# Patient Record
Sex: FEMALE | Race: WHITE | Employment: FULL TIME | URBAN - METROPOLITAN AREA
[De-identification: names, ages, dates, MRNs, and addresses within clinical notes are randomized per-mention and may not be internally consistent; named-entity substitution may affect disease eponyms.]

---

## 2017-07-07 ENCOUNTER — TELEPHONE (OUTPATIENT)
Dept: OBGYN CLINIC | Age: 32
End: 2017-07-07

## 2017-07-27 ENCOUNTER — HOSPITAL ENCOUNTER (OUTPATIENT)
Age: 32
Setting detail: SPECIMEN
Discharge: HOME | End: 2017-07-27
Payer: COMMERCIAL

## 2017-07-28 LAB
CULTURE: NO GROWTH
CULTURE: NORMAL
Lab: NORMAL
SPECIMEN DESCRIPTION: NORMAL
STATUS: NORMAL

## 2017-12-18 ENCOUNTER — OFFICE VISIT (OUTPATIENT)
Dept: OBGYN CLINIC | Age: 32
End: 2017-12-18
Payer: COMMERCIAL

## 2017-12-18 VITALS
BODY MASS INDEX: 29.58 KG/M2 | DIASTOLIC BLOOD PRESSURE: 70 MMHG | HEIGHT: 70 IN | SYSTOLIC BLOOD PRESSURE: 116 MMHG | WEIGHT: 206.6 LBS

## 2017-12-18 DIAGNOSIS — Z12.31 ENCOUNTER FOR SCREENING MAMMOGRAM FOR BREAST CANCER: ICD-10-CM

## 2017-12-18 DIAGNOSIS — Z01.419 VISIT FOR GYNECOLOGIC EXAMINATION: Primary | ICD-10-CM

## 2017-12-18 PROCEDURE — 99395 PREV VISIT EST AGE 18-39: CPT | Performed by: OBSTETRICS & GYNECOLOGY

## 2017-12-18 ASSESSMENT — ENCOUNTER SYMPTOMS
SHORTNESS OF BREATH: 0
DIARRHEA: 0
COUGH: 0
ABDOMINAL PAIN: 0
CONSTIPATION: 0
BACK PAIN: 0
ABDOMINAL DISTENTION: 0

## 2017-12-18 NOTE — PROGRESS NOTES
Subjective:      Patient ID: Enriqueta Sethi is a 33 y.o. female. HPI   Enriqueta Sethi is a 33 y.o. [id number], here for her annual exam.  The patient was seen and examined. The patients past medical, surgical, social and family history were reviewed. Current medications and allergies were reviewed, and documented in the chart. The patient had a hysterectomy after her last delivery because of the cervical laceration. She is done well since then. Her children are doing well (a daughter 21 months older than her son)  Her mother had a very early breast cancer in her 35s, so Bryce Ewing is already getting mammograms yearly    Menopausal history: Status post ТАТЬЯНА, but she still has ovaries    Blood pressure 116/70, height 5' 10\" (1.778 m), weight 206 lb 9.6 oz (93.7 kg), last menstrual period 2013, not currently breastfeeding. Wt Readings from Last 3 Encounters:   17 206 lb 9.6 oz (93.7 kg)   17 203 lb (92.1 kg)   16 203 lb (92.1 kg)     Recent Results (from the past 8736 hour(s))   POCT Urinalysis no Micro    Collection Time: 17 10:28 AM   Result Value Ref Range    Color, UA      Clarity, UA      Glucose, UA POC neg     Bilirubin, UA neg     Ketones, UA neg     Spec Grav, UA 1.010     Blood, UA POC trace-lysed     pH, UA 7.0     Protein, UA POC neg     Urobilinogen, UA 0.2     Leukocytes, UA trace     Nitrite, UA neg    Urine culture clean catch    Collection Time: 17  5:20 PM   Result Value Ref Range    Specimen Description . CLEAN CATCH URINE     Special Requests NOT REPORTED     Culture NO GROWTH     Culture       [facility] [id number] [facility], [address] [place] [address] [phone]    Status FINAL 2017      Past Medical History:   Diagnosis Date    Abdominal pain     Abdominal pain     rate 8    Anemia 2013 pregnancy     Caffeine use     1 coffee / day    Cervical laceration 6/8/15    maternal     Constipation     Endometriosis     Heart constipation and diarrhea. Genitourinary: Negative for flank pain, frequency, menstrual problem, pelvic pain and vaginal discharge. Musculoskeletal: Negative for back pain. Neurological: Negative for syncope and headaches. Psychiatric/Behavioral: Negative for behavioral problems. Objective:   Physical Exam   Constitutional: She is oriented to person, place, and time. She appears well-developed and well-nourished. Eyes: Pupils are equal, round, and reactive to light. Neck: No thyroid mass and no thyromegaly present. Cardiovascular: Normal rate, regular rhythm and normal heart sounds. No murmur heard. Pulmonary/Chest: Breath sounds normal. She exhibits no tenderness. Right breast exhibits no inverted nipple, no mass, no nipple discharge, no skin change and no tenderness. Left breast exhibits no inverted nipple, no mass, no nipple discharge, no skin change and no tenderness. Abdominal: Soft. She exhibits no distension and no mass. There is no tenderness. There is no rebound and no CVA tenderness. No hernia. Genitourinary: Vagina normal. There is no lesion on the right labia. There is no lesion on the left labia. Right adnexum displays no mass and no tenderness. Left adnexum displays no mass and no tenderness. No vaginal discharge found. Genitourinary Comments: Vaginal Cuff is intact and well supported, without any lesions   Musculoskeletal: She exhibits no edema. Lymphadenopathy:     She has no cervical adenopathy. Neurological: She is alert and oriented to person, place, and time. Skin: Skin is dry. Psychiatric: She has a normal mood and affect. Her behavior is normal.       Assessment:      Encounter Diagnoses   Name Primary?  Visit for gynecologic examination Yes    Encounter for screening mammogram for breast cancer            Plan:      1. Pap not collected. Discussed new pap smear guidelines. Desires re-pap in 2 years. 2. Breast self exam reviewed  3.  Calcium and Vitamin D dosing reviewed. 4. Seat belt use reviewed  5. We discussed the frequency of her exams. I suggest that she does not need Paps, though I would probably check one at 5 years, which will be 2 years from now. In the meanwhile, because of her strong family history of breast cancer, we agree to yearly mammograms would be ron, and I cautioned her to have the ovaries checked as she gets older.

## 2018-01-18 ENCOUNTER — HOSPITAL ENCOUNTER (OUTPATIENT)
Dept: WOMENS IMAGING | Age: 33
Discharge: HOME | End: 2018-01-18
Payer: COMMERCIAL

## 2018-01-18 DIAGNOSIS — Z12.31 ENCOUNTER FOR SCREENING MAMMOGRAM FOR BREAST CANCER: ICD-10-CM

## 2018-01-18 PROCEDURE — 77063 BREAST TOMOSYNTHESIS BI: CPT

## 2018-01-22 ENCOUNTER — TELEPHONE (OUTPATIENT)
Dept: OBGYN CLINIC | Age: 33
End: 2018-01-22

## 2018-01-26 ENCOUNTER — HOSPITAL ENCOUNTER (OUTPATIENT)
Dept: WOMENS IMAGING | Age: 33
Discharge: HOME | End: 2018-01-26
Payer: COMMERCIAL

## 2018-01-26 ENCOUNTER — HOSPITAL ENCOUNTER (OUTPATIENT)
Dept: WOMENS IMAGING | Age: 33
End: 2018-01-26
Payer: COMMERCIAL

## 2018-01-26 DIAGNOSIS — R92.8 ABNORMAL MAMMOGRAM: ICD-10-CM

## 2018-01-26 PROCEDURE — G0279 TOMOSYNTHESIS, MAMMO: HCPCS

## 2019-02-18 ENCOUNTER — HOSPITAL ENCOUNTER (OUTPATIENT)
Dept: WOMENS IMAGING | Age: 34
Discharge: HOME | End: 2019-02-20
Payer: COMMERCIAL

## 2019-02-18 DIAGNOSIS — Z12.31 ENCOUNTER FOR SCREENING MAMMOGRAM FOR MALIGNANT NEOPLASM OF BREAST: ICD-10-CM

## 2019-02-18 DIAGNOSIS — Z12.31 ENCOUNTER FOR SCREENING MAMMOGRAM FOR MALIGNANT NEOPLASM OF BREAST: Primary | ICD-10-CM

## 2019-02-18 PROCEDURE — 77063 BREAST TOMOSYNTHESIS BI: CPT

## 2019-02-19 ENCOUNTER — TELEPHONE (OUTPATIENT)
Dept: OBGYN CLINIC | Age: 34
End: 2019-02-19

## 2019-02-22 ENCOUNTER — HOSPITAL ENCOUNTER (OUTPATIENT)
Dept: WOMENS IMAGING | Age: 34
Discharge: HOME | End: 2019-02-24
Payer: COMMERCIAL

## 2019-02-22 ENCOUNTER — HOSPITAL ENCOUNTER (OUTPATIENT)
Dept: WOMENS IMAGING | Age: 34
End: 2019-02-22
Payer: COMMERCIAL

## 2019-02-22 DIAGNOSIS — R92.8 ABNORMAL MAMMOGRAM: ICD-10-CM

## 2019-02-22 PROCEDURE — 77065 DX MAMMO INCL CAD UNI: CPT

## 2019-11-06 ENCOUNTER — TELEPHONE (OUTPATIENT)
Dept: OBGYN CLINIC | Age: 34
End: 2019-11-06

## 2019-11-06 DIAGNOSIS — Z80.3 FH: BREAST CANCER IN RELATIVE WHEN <45 YEARS OLD: ICD-10-CM

## 2019-11-06 DIAGNOSIS — R92.2 BREAST DENSITY: Primary | ICD-10-CM

## 2019-11-06 DIAGNOSIS — Z80.3 FH: BREAST CANCER IN FIRST DEGREE RELATIVE: ICD-10-CM

## 2019-11-06 DIAGNOSIS — Z12.31 ENCOUNTER FOR SCREENING MAMMOGRAM FOR HIGH-RISK PATIENT: ICD-10-CM

## 2020-08-21 ENCOUNTER — HOSPITAL ENCOUNTER (EMERGENCY)
Age: 35
Discharge: HOME | End: 2020-08-21
Attending: EMERGENCY MEDICINE
Payer: COMMERCIAL

## 2020-08-21 VITALS
HEIGHT: 70 IN | BODY MASS INDEX: 30.06 KG/M2 | HEART RATE: 95 BPM | TEMPERATURE: 98.2 F | WEIGHT: 210 LBS | OXYGEN SATURATION: 99 % | SYSTOLIC BLOOD PRESSURE: 149 MMHG | DIASTOLIC BLOOD PRESSURE: 86 MMHG | RESPIRATION RATE: 15 BRPM

## 2020-08-21 LAB
-: ABNORMAL
AMORPHOUS: ABNORMAL
BACTERIA: ABNORMAL
BILIRUBIN URINE: NEGATIVE
CASTS UA: ABNORMAL /LPF
COLOR: YELLOW
COMMENT UA: ABNORMAL
CRYSTALS, UA: ABNORMAL /HPF
EPITHELIAL CELLS UA: ABNORMAL /HPF (ref 0–5)
GLUCOSE URINE: NEGATIVE
KETONES, URINE: NEGATIVE
LEUKOCYTE ESTERASE, URINE: ABNORMAL
MUCUS: ABNORMAL
NITRITE, URINE: NEGATIVE
OTHER OBSERVATIONS UA: ABNORMAL
PH UA: 6.5 (ref 5–8)
PROTEIN UA: NEGATIVE
RBC UA: ABNORMAL /HPF (ref 0–2)
RENAL EPITHELIAL, UA: ABNORMAL /HPF
SPECIFIC GRAVITY UA: 1.01 (ref 1–1.03)
TRICHOMONAS: ABNORMAL
TURBIDITY: ABNORMAL
URINE HGB: ABNORMAL
UROBILINOGEN, URINE: NORMAL
WBC UA: ABNORMAL /HPF (ref 0–5)
YEAST: ABNORMAL

## 2020-08-21 PROCEDURE — 99283 EMERGENCY DEPT VISIT LOW MDM: CPT

## 2020-08-21 PROCEDURE — 87077 CULTURE AEROBIC IDENTIFY: CPT

## 2020-08-21 PROCEDURE — 81001 URINALYSIS AUTO W/SCOPE: CPT

## 2020-08-21 PROCEDURE — 87086 URINE CULTURE/COLONY COUNT: CPT

## 2020-08-21 PROCEDURE — 86403 PARTICLE AGGLUT ANTBDY SCRN: CPT

## 2020-08-21 RX ORDER — SULFAMETHOXAZOLE AND TRIMETHOPRIM 800; 160 MG/1; MG/1
1 TABLET ORAL 2 TIMES DAILY
Qty: 14 TABLET | Refills: 0 | Status: SHIPPED | OUTPATIENT
Start: 2020-08-21 | End: 2020-08-28

## 2020-08-21 RX ORDER — PHENAZOPYRIDINE HYDROCHLORIDE 200 MG/1
200 TABLET, FILM COATED ORAL 3 TIMES DAILY PRN
Qty: 6 TABLET | Refills: 0 | Status: SHIPPED | OUTPATIENT
Start: 2020-08-21 | End: 2020-08-24

## 2020-08-21 NOTE — ED PROVIDER NOTES
[address] [facility]  eMERGENCY dEPARTMENT eNCOUnter      Pt Name: Ana Li  MRN: [id number]  [place] 1985  Date of evaluation: 2020      CHIEF COMPLAINT       Chief Complaint   Patient presents with    Dysuria         HISTORY OF PRESENT ILLNESS      The patient presents with dysuria, urinary frequency, and urgency for the past week and half. She has not had vomiting. She has not had fever. She's had a little bit of back pain but denies suprapubic discomfort. Nothing makes her symptoms better or worse otherwise. REVIEW OF SYSTEMS       All systems reviewed and negative unless noted in HPI. The patient denies fever or constitutional symptoms. Denies vision change. Denies any sore throat or rhinorrhea. Denies any neck pain or stiffness. Denies chest pain or shortness of breath. No nausea,  vomiting or diarrhea. Dysuria, frequency and urgency. Denies musculoskeletal injury or pain. Denies any weakness, numbness or focal neurologic deficit. Denies any skin rash or edema. No recent psychiatric issues. No easy bruising or bleeding. Denies any polyuria, polydypsia or history of immunocompromise. PAST MEDICAL HISTORY    has a past medical history of Abdominal pain, Abdominal pain, Anemia, Caffeine use, Cervical laceration, Constipation, Endometriosis, Heart murmur, Hemorrhoid, Hypoglycemia, Psoriasis, Rh negative state in antepartum period, and Ureter injury. SURGICAL HISTORY      has a past surgical history that includes Appendectomy (6/10); Vulva surgery (); Knee arthroscopy; [place] tooth extraction (2012); laparoscopy ();  section (13);  section (06/08/15); Hysterectomy (06/08/15); Ureter stent placement (06/08/15); Eye surgery; Colonoscopy (08/18/15); eye surgery (Bilateral); and Abdominal adhesion surgery (2015).     CURRENT MEDICATIONS       Previous Medications    MULTIPLE VITAMIN (MULTI-DAY VITAMINS PO)    Take by mouth    TACLONEX 0.005-0.064 % SUSP           ALLERGIES     is allergic to vicodin [hydrocodone-acetaminophen] and codeine. FAMILY HISTORY     She indicated that her mother is . She indicated that her father is alive. She indicated that her brother is alive. She indicated that her maternal grandmother is alive. She indicated that her maternal grandfather is . She indicated that her paternal grandmother is . She indicated that her paternal grandfather is alive. She indicated that both of her maternal aunts are alive. She indicated that the status of her neg hx is unknown.     family history includes Breast Cancer (age of onset: 44) in her mother; Breast Cancer (age of onset: 43) in her maternal aunt; Breast Cancer (age of onset: 50) in her maternal aunt; Diabetes in her father; Heart Disease in her paternal grandmother; Prostate Cancer in her father. SOCIAL HISTORY      reports that she has never smoked. She has never used smokeless tobacco. She reports that she does not drink alcohol or use drugs. PHYSICAL EXAM     INITIAL VITALS:  height is 5' 10\" (1.778 m) and weight is 95.3 kg (210 lb). Her oral temperature is 98.2 °F (36.8 °C). Her blood pressure is 149/86 (abnormal) and her pulse is 95. Her respiration is 15 and oxygen saturation is 99%. Patient is alert and oriented, in no apparent distress. HEENT is atraumatic. Pupils are PERRL at 4 mm. Mucous membranes moist.    Neck is supple with no lymphadenopathy. No JVD. No meningismus. Heart sounds regular rate and rhythm with no gallops, murmurs, or rubs. Lungs clear, no wheezes, rales or rhonchi. Abdomen: soft, nontender with no pain to palpation. No rebound or guarding. No CVA tenderness. Musculoskeletal exam shows no evidence of trauma. Normal distal pulses in all extremities. Skin: no rash or edema.     Neurological exam reveals cranial nerves 2 through 12 grossly intact. Psychiatric:  appropriate  Lymphatics.:  No lymphadenopathy. DIFFERENTIAL DIAGNOSIS/ MDM:     UTI    DIAGNOSTIC RESULTS       LABS:  Results for orders placed or performed during the hospital encounter of 08/21/20   Urinalysis Reflex to Culture    Specimen: Urine, clean catch   Result Value Ref Range    Color, UA YELLOW YELLOW    Turbidity UA SLIGHTLY CLOUDY (A) CLEAR    Glucose, Ur NEGATIVE NEGATIVE    Bilirubin Urine NEGATIVE NEGATIVE    Ketones, Urine NEGATIVE NEGATIVE    Specific Gravity, UA 1.010 1.005 - 1.030    Urine Hgb SMALL (A) NEGATIVE    pH, UA 6.5 5.0 - 8.0    Protein, UA NEGATIVE NEGATIVE    Urobilinogen, Urine Normal Normal    Nitrite, Urine NEGATIVE NEGATIVE    Leukocyte Esterase, Urine MODERATE (A) NEGATIVE    Urinalysis Comments NOT REPORTED    Microscopic Urinalysis   Result Value Ref Range    -          WBC, UA TOO NUMEROUS TO COUNT 0 - 5 /HPF    RBC, UA 0 TO 2 0 - 2 /HPF    Casts UA NOT REPORTED /LPF    Crystals, UA NOT REPORTED None /HPF    Epithelial Cells UA 2 TO 5 0 - 5 /HPF    Renal Epithelial, UA NOT REPORTED 0 /HPF    Bacteria, UA MODERATE (A) None    Mucus, UA NOT REPORTED None    Trichomonas, UA NOT REPORTED None    Amorphous, UA NOT REPORTED None    Other Observations UA Culture ordered based on defined criteria. (A) NOT REQ. Yeast, UA NOT REPORTED None         EMERGENCY DEPARTMENT COURSE:   Vitals:    Vitals:    08/21/20 1708   BP: (!) 149/86   Pulse: 95   Resp: 15   Temp: 98.2 °F (36.8 °C)   TempSrc: Oral   SpO2: 99%   Weight: 95.3 kg (210 lb)   Height: 5' 10\" (1.778 m)     -------------------------  BP: (!) 149/86, Temp: 98.2 °F (36.8 °C), Pulse: 95, Resp: 15      Re-evaluation Notes    I will prescribe antibiotics and Pyridium. The patient is discharged in good condition. FINAL IMPRESSION      1.  Urinary tract infection without hematuria, site unspecified          DISPOSITION/PLAN   DISPOSITION Decision To Discharge 08/21/2020 05:56:02 PM      Condition on Disposition    good    PATIENT REFERRED TO:  Lula Thomas DO  [address] [facility]  [address]  [place] [address] [phone]    In 1 week  As needed      DISCHARGE MEDICATIONS:  New Prescriptions    PHENAZOPYRIDINE (PYRIDIUM) 200 MG TABLET    Take 1 tablet by mouth 3 times daily as needed for Pain (bladder spasm/pain)    SULFAMETHOXAZOLE-TRIMETHOPRIM (BACTRIM DS) 800-160 MG PER TABLET    Take 1 tablet by mouth 2 times daily for 7 days       (Please note that portions of this note were completed with a voice recognition program.  Efforts were made to edit the dictations but occasionally words are mis-transcribed.)    Quiroga MD   Attending Emergency Physician         Rubi Garza MD  08/21/20 8765

## 2020-08-21 NOTE — ED NOTES
Pt arrives to ER with c/o urinary frequency, pain with urination, and flank pain. She states that she has had her symptoms since Monday. Pt resting in bed, comfort offered, no concerns no s/s of distress.       Ra Hollingsworth RN  08/21/20 0327

## 2020-08-24 LAB
CULTURE: ABNORMAL
Lab: ABNORMAL
SPECIMEN DESCRIPTION: ABNORMAL

## 2020-11-03 ENCOUNTER — HOSPITAL ENCOUNTER (OUTPATIENT)
Dept: WOMENS IMAGING | Age: 35
Discharge: HOME | End: 2020-11-05
Payer: COMMERCIAL

## 2020-11-03 PROCEDURE — 77063 BREAST TOMOSYNTHESIS BI: CPT

## 2021-01-15 ENCOUNTER — TELEPHONE (OUTPATIENT)
Dept: OBGYN CLINIC | Age: 36
End: 2021-01-15

## 2021-01-15 DIAGNOSIS — Z80.3 FH: BREAST CANCER IN FIRST DEGREE RELATIVE WHEN <50 YEARS OLD: ICD-10-CM

## 2021-01-15 DIAGNOSIS — R92.2 BREAST DENSITY: Primary | ICD-10-CM

## 2021-01-15 DIAGNOSIS — Z80.3 FH: BREAST CANCER: ICD-10-CM

## 2021-01-15 NOTE — TELEPHONE ENCOUNTER
31 y/o Calling pt today regarding Mammogram done 11/03/20. The office has been tracking her mammogram history due to [facility] and see that MRI is recommended. Pt states she was wanting to get MRI but was waiting until 6 month due to [place] pandemic. Pt is agreeable to getting MRI 5/03/21. Advised:  -Will place order and send out reminder then when it's due.

## 2021-04-15 ENCOUNTER — TELEPHONE (OUTPATIENT)
Dept: OBGYN CLINIC | Age: 36
End: 2021-04-15

## 2021-04-27 ENCOUNTER — HOSPITAL ENCOUNTER (OUTPATIENT)
Age: 36
Setting detail: SPECIMEN
Discharge: HOME | End: 2021-04-27
Payer: COMMERCIAL

## 2021-04-27 ENCOUNTER — OFFICE VISIT (OUTPATIENT)
Dept: FAMILY MEDICINE CLINIC | Age: 36
End: 2021-04-27
Payer: COMMERCIAL

## 2021-04-27 VITALS
HEIGHT: 70 IN | BODY MASS INDEX: 28.63 KG/M2 | SYSTOLIC BLOOD PRESSURE: 128 MMHG | OXYGEN SATURATION: 98 % | RESPIRATION RATE: 16 BRPM | TEMPERATURE: 98.2 F | DIASTOLIC BLOOD PRESSURE: 81 MMHG | WEIGHT: 200 LBS | HEART RATE: 72 BPM

## 2021-04-27 DIAGNOSIS — Z20.822 CLOSE EXPOSURE TO COVID-19 VIRUS: Primary | ICD-10-CM

## 2021-04-27 PROCEDURE — 99212 OFFICE O/P EST SF 10 MIN: CPT | Performed by: NURSE PRACTITIONER

## 2021-04-27 ASSESSMENT — PATIENT HEALTH QUESTIONNAIRE - PHQ9
SUM OF ALL RESPONSES TO PHQ9 QUESTIONS 1 & 2: 0
SUM OF ALL RESPONSES TO PHQ QUESTIONS 1-9: 0
2. FEELING DOWN, DEPRESSED OR HOPELESS: 0
SUM OF ALL RESPONSES TO PHQ QUESTIONS 1-9: 0
SUM OF ALL RESPONSES TO PHQ QUESTIONS 1-9: 0

## 2021-04-27 ASSESSMENT — ENCOUNTER SYMPTOMS
SORE THROAT: 1
NAUSEA: 0

## 2021-04-27 NOTE — PROGRESS NOTES
[address] [facility] [address]  [facility] [address]     Date of Visit:  2021  Patient Name: Eddie Jennings   Patient :  1985     CHIEF COMPLAINT:     Eddie Jennings is a 30 y.o. female who presents today is c/o of Generalized Body Aches (symptoms started 2021 ), Fever, Pharyngitis, and Chest Congestion          REVIEW OF SYSTEM      Review of Systems   Constitutional: Positive for chills and fatigue. HENT: Positive for sore throat. Gastrointestinal: Negative for nausea. Musculoskeletal: Positive for arthralgias and myalgias. Neurological: Positive for headaches. Negative for weakness. HISTORY OF PRESENT ILLNESS     Keira Blanco is being seen today for c/o fatigue, scratchy throat, headache that started yesterday. She also reports she feels very cold. She had a low grade fever during the night of 100.7. She is a  and a student was diagnosed with [place] last Tuesday, she was in class on Monday and she was exposed to her at that time. She denies further concerns. Headache   This is a new problem. The current episode started yesterday. The problem occurs constantly. The problem has been unchanged. The quality of the pain is described as aching. The pain is at a severity of 4/10. Associated symptoms include a sore throat. Pertinent negatives include no nausea or weakness. Pharyngitis  This is a new problem. The current episode started yesterday. The problem occurs constantly. Associated symptoms include arthralgias, chills, fatigue, headaches, myalgias and a sore throat. Pertinent negatives include no nausea or weakness. Fatigue  This is a new problem. The current episode started yesterday. The problem occurs constantly. The problem has been gradually worsening. Associated symptoms include arthralgias, chills, fatigue, headaches, myalgias and a sore throat. Pertinent negatives include no nausea or weakness.  Nothing aggravates the symptoms. She has tried nothing for the symptoms. REVIEWED INFORMATION      Allergies   Allergen Reactions    Vicodin [Hydrocodone-Acetaminophen] Hives    Codeine Hives and Nausea And Vomiting       Patient Active Problem List   Diagnosis    Heart murmur    Endometriosis    Rh neg    Failed vacuum extraction delivery    Pelvic pain in female    Difficult bowel movements    9/22/15 Operative Laparoscopy, KAREN    Ureter injury    Secondary right vesicoureteral reflux       Past Medical History:   Diagnosis Date    Abdominal pain     Abdominal pain     rate 8    Anemia 2013 pregnancy     Caffeine use     1 coffee / day    Cervical laceration 6/8/15    maternal     Constipation     Endometriosis     Heart murmur     Hemorrhoid     Hypoglycemia     Psoriasis     scalp    Rh negative state in antepartum period 2013    Ureter injury 2015       Past Surgical History:   Procedure Laterality Date    ABDOMINAL ADHESION SURGERY  2015    lysis adhesion sigmoid/placed seprafilm    APPENDECTOMY  6/10     SECTION  13     SECTION  06/08/15    COLONOSCOPY  08/18/15    normal/internal hemorrhoids    EYE SURGERY      EYE SURGERY Bilateral     lasek    HYSTERECTOMY  06/08/15    Ovarian preservation    KNEE ARTHROSCOPY      RIGHT    LAPAROSCOPY  2009    URETER STENT PLACEMENT  06/08/15    VULVA SURGERY  2008    LABIOPLASTY    WISDOM TOOTH EXTRACTION  2012            PHYSICAL EXAM     /81   Pulse 72   Temp 98.2 °F (36.8 °C) (Temporal)   Resp 16   Ht 5' 10\" (1.778 m)   Wt 200 lb (90.7 kg)   LMP 2013 (LMP Unknown)   SpO2 98%   BMI 28.70 kg/m²    Physical Exam  Vitals signs reviewed. Constitutional:       Appearance: She is ill-appearing.    HENT:      Right Ear: Tympanic membrane, ear canal and external ear normal.      Left Ear: Tympanic membrane and ear canal normal.      Mouth/Throat:      Pharynx: Posterior oropharyngeal erythema present. Comments: Post nasal drainage  Cardiovascular:      Heart sounds: Normal heart sounds. Pulmonary:      Breath sounds: Normal breath sounds. Abdominal:      General: Bowel sounds are normal.   Skin:     General: Skin is warm and dry. Coloration: Skin is pale. ASSESSMENT/PLAN     1. Close exposure to COVID-19 virus    - COVID-19; Future    COVID instruction handout given. Will call with results of COVID test, quarantine at home until test results return    Return if symptoms worsen or fail to improve.     COMMUNICATION:       Electronically signed by LUISA Kendrick CNP on 4/27/2021 at 9:08 AM

## 2021-04-27 NOTE — PATIENT INSTRUCTIONS
Patient Education        COVID-19 Viral Test: About This Test  What is it? A COVID-19 viral test is a way to find out if you have COVID-19. The test looks for the virus in your breathing passages. There are different types of viral tests. One type looks for genetic material from the virus. This is usually called polymerase chain reaction (PCR). Another type looks for proteins on the virus. This is usually called an antigen test. It may not be as accurate as PCR. Some test results come back in a few minutes. Others may take a few days. Why is it done? This test is used to diagnose a current infection with SARS-CoV-2, the virus that causes COVID-19. Knowing that you have the virus means that you can take steps to protect others from getting infected. This can help limit the spread of the virus. Knowing who has COVID-19 is also important for experts who track the virus. It can help them learn more about how the virus spreads. How do you prepare for the test?  You don't need to do anything to prepare for this test. But be sure to follow any instructions your health care provider gives you. How is it done? The test is most often done on a sample from your nose or throat. It's sometimes done on a sample of saliva. One way a sample is collected is by putting a long swab into the back of your nose. Samples can be tested in different ways to look for an infection. What should you do while you wait for your test results? While you wait for the results of your COVID-19 test, stay in the place where you live, and stay away from others. Do this even if you don't feel sick or have any symptoms. Don't leave unless you need medical care. If you can, try to stay in a separate room. This might help you avoid infecting family members or other people you live with. Follow your doctor's instructions about what to do when you get your results back.   Be sure to wear a mask and follow social-distancing guidelines after you get Instructions  Your Care Instructions     An upper respiratory infection, or URI, is an infection of the nose, sinuses, or throat. URIs are spread by coughs, sneezes, and direct contact. The common cold is the most frequent kind of URI. The flu and sinus infections are other kinds of URIs. Almost all URIs are caused by viruses. Antibiotics won't cure them. But you can treat most infections with home care. This may include drinking lots of fluids and taking over-the-counter pain medicine. You will probably feel better in 4 to 10 days. The doctor has checked you carefully, but problems can develop later. If you notice any problems or new symptoms, get medical treatment right away. Follow-up care is a key part of your treatment and safety. Be sure to make and go to all appointments, and call your doctor if you are having problems. It's also a good idea to know your test results and keep a list of the medicines you take. How can you care for yourself at home? · To prevent dehydration, drink plenty of fluids. Choose water and other caffeine-free clear liquids until you feel better. If you have kidney, heart, or liver disease and have to limit fluids, talk with your doctor before you increase the amount of fluids you drink. · Take an over-the-counter pain medicine, such as acetaminophen (Tylenol), ibuprofen (Advil, Motrin), or naproxen (Aleve). Read and follow all instructions on the label. · Before you use cough and cold medicines, check the label. These medicines may not be safe for young children or for people with certain health problems. · Be careful when taking over-the-counter cold or flu medicines and Tylenol at the same time. Many of these medicines have acetaminophen, which is Tylenol. Read the labels to make sure that you are not taking more than the recommended dose. Too much acetaminophen (Tylenol) can be harmful. · Get plenty of rest.  · Do not smoke or allow others to smoke around you.  If you need help quitting, talk to your doctor about stop-smoking programs and medicines. These can increase your chances of quitting for good. When should you call for help? Call 911 anytime you think you may need emergency care. For example, call if:    · You have severe trouble breathing. Call your doctor now or seek immediate medical care if:    · You seem to be getting much sicker.     · You have new or worse trouble breathing.     · You have a new or higher fever.     · You have a new rash. Watch closely for changes in your health, and be sure to contact your doctor if:    · You have a new symptom, such as a sore throat, an earache, or sinus pain.     · You cough more deeply or more often, especially if you notice more mucus or a change in the color of your mucus.     · You do not get better as expected. Where can you learn more? Go to https://AdNectarpepiceweb.Miyaobabei. org and sign in to your [employer] account. Enter G113 in the Obviousidea box to learn more about \"Upper Respiratory Infection (Cold): Care Instructions. \"     If you do not have an account, please click on the \"Sign Up Now\" link. Current as of: October 26, 2020               Content Version: 12.8  © 2006-2021 [employer]. Care instructions adapted under license by [facility] ([facility]). If you have questions about a medical condition or this instruction, always ask your healthcare professional. [place] [address] any warranty or liability for your use of this information.        Mucinex fast max  Hot steamy showers  Warm tea and honey for congestion  Tylenol for fever and aches

## 2021-04-28 DIAGNOSIS — Z20.822 CLOSE EXPOSURE TO COVID-19 VIRUS: ICD-10-CM

## 2021-04-29 LAB
SARS-COV-2: NORMAL
SARS-COV-2: NOT DETECTED
SOURCE: NORMAL

## 2021-05-20 ENCOUNTER — OFFICE VISIT (OUTPATIENT)
Dept: OBGYN CLINIC | Age: 36
End: 2021-05-20
Payer: COMMERCIAL

## 2021-05-20 VITALS
SYSTOLIC BLOOD PRESSURE: 128 MMHG | WEIGHT: 217 LBS | HEIGHT: 70 IN | BODY MASS INDEX: 31.07 KG/M2 | DIASTOLIC BLOOD PRESSURE: 76 MMHG

## 2021-05-20 DIAGNOSIS — Z01.419 WOMEN'S ANNUAL ROUTINE GYNECOLOGICAL EXAMINATION: Primary | ICD-10-CM

## 2021-05-20 DIAGNOSIS — Z80.3 FAMILY HISTORY OF SECONDARY BREAST CANCER: ICD-10-CM

## 2021-05-20 DIAGNOSIS — Z80.3 FH: BREAST CANCER IN FIRST DEGREE RELATIVE WHEN <50 YEARS OLD: ICD-10-CM

## 2021-05-20 PROCEDURE — 99395 PREV VISIT EST AGE 18-39: CPT | Performed by: STUDENT IN AN ORGANIZED HEALTH CARE EDUCATION/TRAINING PROGRAM

## 2021-05-20 NOTE — PROGRESS NOTES
[facility]   [address] [facility]  2021                       Primary Care Physician: Madhuri Chirinos DO    CC:   Chief Complaint   Patient presents with    Annual Exam     fhx breast cancer, hyst         HPI: Eddie Jennings is a 30 y.o. female Carlos Nabor Patient's last menstrual period was 2013 (lmp unknown). The patient was seen and examined. She is here for an annual visit. She is complaining of nothing. Patient is s/p C-HYST with her last delivery is 1. Cervix was taken at that time. Her bowel habits are regular. She denies any bloating. She denies dysuria. She denies urinary leaking. She denies vaginal discharge. She is sexually active with single partner, contraception - status post hysterectomy. She uses hysterectomy for contraception and is not desiring pregnancy. Patient has a significant family history of breast cancer. The patient's mother was diagnosed at age 44 and ultimately  as a result of that. Patient's maternal aunts also have both been diagnosed with breast cancer at ages 43 and 50. Patient meets qualifications to be sent to a genetic counselor at this time. Patient has had multiple mammograms already but has had difficulty getting the MRIs covered due to insurance.     Depression Screen: Symptoms of decreased mood absent  Symptoms of anhedonia absent  **If either question is answered in a  positive fashion then complete the PHQ9 Scoring Evaluation and make the appropriate referral**    REVIEW OF SYSTEMS:   Constitutional: negative fever, negative chills  HEENT: negative visual disturbances, negative headaches  Respiratory: negative dyspnea, negative cough  Cardiovascular: negative chest pain,  negative palpitations  Gastrointestinal: negative abdominal pain, negative RUQ pain, negative N/V, negative diarrhea, negative constipation  Genitourinary: negative dysuria, negative vaginal discharge Dermatological: negative rash  Hematologic: negative bruising  Immunologic/Lymphatic: negative recent illness, negative recent sick contact  Musculoskeletal: negative back pain, negative myalgias, negative arthralgias  Neurological:  negative dizziness, negative weakness  Behavior/Psych: negative depression, negative anxiety      GYNECOLOGICAL HISTORY:  Age of Menarche: 16  Age of Menopause: N/A     STD History: no past history    Pap History: Last PAP was normal; 2014. Colposcopy History: denies    Permanent Sterilization: yes - ТАТЬЯНА RS  Reversible Birth Control: no  Hormone Replacement Exposure: no    OBSTETRICAL HISTORY:  OB History    Para Term  AB Living   2 2 2 0 0 2   SAB TAB Ectopic Molar Multiple Live Births   0 0 0 0 0 2      # Outcome Date GA Lbr Stan/2nd Weight Sex Delivery Anes PTL Lv   2 Term 06/08/15 39w0d  9 lb 14.2 oz (4.485 kg) M CS-LTranv  N CARLOS      Complications: Failure to Progress in Second Stage      Name: Emily Matthews: 8  Apgar5: 9   1 Term 13 38w6d  8 lb 8 oz (3.856 kg) F CS-LTranv Spinal N CARLOS      Birth Comments: Rest of dilation, occiput posterior, fetal intolerance to pitocin      Apgar1: 8  Apgar5: 9       PAST MEDICAL HISTORY:   has a past medical history of Abdominal pain, Abdominal pain, Anemia, Caffeine use, Cervical laceration, Constipation, Endometriosis, Heart murmur, Hemorrhoid, Hypoglycemia, Psoriasis, Rh negative state in antepartum period, and Ureter injury. PAST SURGICAL HISTORY:   has a past surgical history that includes Appendectomy (2010); Vulva surgery (2008); Knee arthroscopy; [place] tooth extraction (2012); laparoscopy (2009);  section (2013);  section (2015); Hysterectomy, total abdominal (2015); Ureter stent placement (2015); Eye surgery; Colonoscopy (2015); eye surgery (Bilateral); and Abdominal adhesion surgery (2015).     ALLERGIES:  is allergic to vicodin [hydrocodone-acetaminophen] and codeine. MEDICATIONS:  Prior to Admission medications    Medication Sig Start Date End Date Taking? Authorizing Provider   Multiple Vitamin (MULTI-DAY VITAMINS PO) Take by mouth    Historical Provider, MD   TACLONEX 0.005-0.064 % SUSP  5/24/17   Historical Provider, MD       FAMILY HISTORY:  Family History of Breast, Ovarian, Colon or Uterine Cancer: Yes Mother with Breast CA at 44, maternal Aunts with Breast CA at age 43 and 50.   family history includes Breast Cancer (age of onset: 44) in her mother; Breast Cancer (age of onset: 43) in her maternal aunt; Breast Cancer (age of onset: 50) in her maternal aunt; Diabetes in her father; Heart Disease in her paternal grandmother; Prostate Cancer in her father. SOCIAL HISTORY:   reports that she has never smoked. She has never used smokeless tobacco. She reports current alcohol use. She reports that she does not use drugs. HEALTH MAINTENANCE:  Immunization status: up to date and documented    ROUTINE GYN HEALTH MAINTENANCE  Mammogram: 11/2020 BIRADS 1. Colonoscopy: N/A  Pap Smears: Last 11/2014 Neg. Discontinued s/p Hyst 2015. Family Planning: S/p ТАТЬЯНА RS 2015  DEXA: N/A    VITALS:  Vitals:    05/20/21 1541   BP: 128/76   Site: Left Upper Arm   Position: Sitting   Cuff Size: Large Adult   Weight: 217 lb (98.4 kg)   Height: 5' 10\" (1.778 m)                                                                                                                                                                                                        PHYSICAL EXAM:   General Appearance: Appears healthy. Alert; in no acute distress. Pleasant. Skin: Skin color, texture, turgor normal. No rashes or lesions. HEENT: normocephalic and atraumatic, Thyroid normal to inspection and palpation  Respiratory: Normal expansion. Clear to auscultation. No rales, rhonchi, or wheezing.   Cardiovascular: normal rate, normal S1 and S2, no gallops, intact distal pulses and no carotid bruits  Breast:  (Chest): normal appearance, no masses or tenderness  Abdomen: soft, non-tender, non-distended, no right upper quadrant tenderness and no CVA tenderness  Pelvic Exam:   External genitalia: General appearance; normal, Hair distribution; normal, Lesions absent  Urinary system: urethral meatus normal  Vaginal: normal mucosa, no discharge  Cervix: normal appearing cervix without discharge or lesions  Adnexa: non-palpable  Uterus: normal single, nontender  Rectal Exam: exam declined by patient  Musculoskeletal: no gross abnormalities  Extremities: non-tender BLE and non-edematous  Psych:  oriented to time, place and person     DATA:  No results found for this visit on 05/20/21. ASSESSMENT & PLAN:    Yajaira Sethi is a 30 y.o. female [id number] Patient's last menstrual period was 02/27/2013 (lmp unknown). Annual:   Mammogram: 11/2020 BIRADS 1. Colonoscopy: N/A   Pap Smears: Last 11/2014 Neg. Discontinued s/p Hyst 2015.    Family Planning: S/p ТАТЬЯНА RS 2015   DEXA: N/A    FHx Breast CA in Mother (43) and 2 maternal aunts (43 & 50)   - Referral to genetic counselor    Patient Active Problem List    Diagnosis Date Noted    Rh neg 06/19/2013     Priority: High     Rhogam given on 10/14/2014 repeat every 12 weeks next injection 1/14/2015      Failed vacuum extraction delivery 06/08/2015     Priority: Medium    Heart murmur      Priority: Low     Symptomatic since positive pregnancy test      Endometriosis      Priority: Low    Ureter injury 10/30/2015    Secondary right vesicoureteral reflux 10/30/2015    Pelvic pain in female 09/22/2015    Difficult bowel movements 09/22/2015    9/22/15 Operative Laparoscopy, KAREN 09/22/2015     Sigmoid adhesions, omental adhesions         Return in about 1 year (around 5/20/2022) for Annual.         Counseling Completed:    Discussed need for repeat pap as per American Society for Colposcopy and Cervical Pathology guidelines. Discussed need for mammograms every 1 year, If >44 yo and last mammogram was negative. Discussed Calcium and Vitamin D dosing. Discussed need for colonoscopy screening as well as onset for bone density testing. Discussed birth control and barrier recommendations. Discussed STD counseling and prevention. Discussed Gardisil counseling for all patients 10-35 yo. Hereditary Breast, Ovarian, Colon and Uterine Cancer screening discussed. Tobacco & Secondary smoke risks discussed; with recommendation for cessation and avoidance. Routine health maintenance per patients PCP discussed. Diagnosis Orders   1. Women's annual routine gynecological examination     2. FH: breast cancer in first degree relative when <48years old  [address] [place] [address]   3.  Family history of secondary breast cancer  [address] [place] [address],  [facility], [place]  5/20/2021, 4:18 PM

## 2021-08-31 ENCOUNTER — INITIAL CONSULT (OUTPATIENT)
Dept: ONCOLOGY | Age: 36
End: 2021-08-31
Payer: COMMERCIAL

## 2021-08-31 DIAGNOSIS — Z80.42 FAMILY HISTORY OF PROSTATE CANCER: ICD-10-CM

## 2021-08-31 DIAGNOSIS — Z80.3 FAMILY HISTORY OF BREAST CANCER: Primary | ICD-10-CM

## 2021-08-31 PROCEDURE — 96040 PR GENETIC COUNSELING, EACH 30 MIN: CPT | Performed by: GENETIC COUNSELOR, MS

## 2021-09-01 NOTE — PROGRESS NOTES
3 [facility]   Hereditary Cancer Risk Assessment     Name: Ali Hamman   YOB: 1985   Date of Consultation:  8/31/21  Ms. Rhett Guzmán was seen at the [place] [address] for genetic counseling on 8/31/21. Ms. Rhett Guzmán was referred by Katelyn Mendenhall, due to her family history of cancer. PERSONAL HISTORY   Ms. Rhett Guzmán is a 29 y.o.  female with no personal history of cancer. Ms. Rhett Guzmán reports menarche at age 14, first child at age 33, and is premenopausal.     Ms. Rhett Guzmán has never had a hysterectomy and both ovaries are intact. Ms. Rhett Guzmán reports annual mammograms. She has never had a breast MRI or required a breast biopsy. FAMILY HISTORY  Ms. Rhett Guzmán has one son (6y) and one daughter (7y). She has one full brother (40y), no cancer. Ms. Floresita Rosa mother was diagnosed with breast cancer initially at age 40, she later passed away from the disease at age 61. She relates that her mother had BRCA1/2 testing in 2009; however, a copy of her results are unavailable. She reports that they were negative. Ms. Floresita Rosa maternal aunt also had breast cancer in her 42-51s. Another maternal aunt had skin cancer. Ms. Floresita Rosa father is living at age 72 with a history of prostate cancer diagnosed at age 48. Her father had just one brother, living at age 58 no cancer. Ms. Rhett Guzmán reports [place] ancestry and denies any known Ashkenazi Religion heritage. RISK ASSESSMENT   We discussed that approximately 5-10% of cancers are due to a hereditary gene mutation which causes an increased risk for certain cancers. Hereditary cancers are typically diagnosed at younger ages (under age 46y) and occur in multiple generations of a family.  Multiple individuals with the same type of cancer (example: breast or colorectal) or uncommon cancers (example: ovarian, pancreatic, male breast cancer) are also features of hereditary cancers. We discussed that Ms. Tello's maternal family history is concerning for a hereditary predisposition given that she has several close relatives with early onset cancer including breast cancer. In summary, Ms. Ericka Garciao 37 (NCCN) guidelines for genetic testing of the BRCA1/2 genes based on having a first degree relative (mother) with breast cancer diagnosed under age 39. Her mother is ; thus, it is appropriate to offer genetic testing to Ms. Kash Covarrubias at this time. The NCCN guidelines also recognize that an individual's personal and/or family history may be explained by more than one inherited cancer syndrome. Thus, a multi-gene panel may be more efficient, more cost effecting, and increases the yield of detecting a hereditary mutation which would impact medical management. Given her personal and/or family history, we recommend testing for the following genes at minimum: JODY, CHEK2, NBN, and PALB2. DISCUSSION  We discussed that the BRCA1/2 genes are the most common genes associated with hereditary breast and ovarian cancer. We also discussed that genetic testing is available for multiple other genes related to hereditary cancer. Some of these genes are known to carry a significant increased risk for several cancers including colon, breast, uterine, ovarian, stomach, and pancreatic cancer, while some of these genes are believed to have a moderate increased risk for breast and other cancers. We discussed the possibility of finding a mutation in genes with limited information to guide medical management, as well we as the possibility of identifying variants of uncertain significance (VUS). We discussed the risks, benefits, and limitations of genetic testing. Possible test results were discussed as well as potential screening and prevention strategies.  Specifically, we discussed increased breast cancer surveillance by mammogram and breast MRI as well as the option of prophylactic mastectomy. We discussed the recommendation for prophylactic oophorectomy for results which suggest an increased risk for ovarian cancer. Lastly, we discussed that the results of Ms. Tello's genetic testing may be beneficial in defining her risk for cancer as well as for her family members. SUMMARY & PLAN  1) Ms. Sandra Smith meets the NCCN criteria for genetic testing based on having a first degree relative with breast cancer under age 39. She also has an extended maternal family history of early onset breast cancer and a paternal family history of early onset prostate cancer. 2) Genetic testing for the BRCA1/2 genes as well as a multi-gene panel was recommended and offered to Ms. Sandra Smith. 3) She is aware that [address] [place] [address] requires prior authorization for genetic testing. This will be requested and we will contact her with the approval when available. She will need to return to the office briefly for paperwork and blood work. 4) We will call Ms. Sandra Smith with results as soon as they are available. A follow up appointment may be recommended. A summary letter with results and final medical management recommendations will be sent once available. A total of 30 minutes were spent face to face with Ms. Sandra Smith and 50% of the time was spent educating and counseling. The [address]  [place] National Program would be glad to offer our assistance should you have any questions or concerns about this information. Please feel free to contact us at [phone]. Thang Oh.  Camelia Mattson MS, [facility]   Licensed Genetic Counselor

## 2021-09-20 ENCOUNTER — TELEPHONE (OUTPATIENT)
Dept: ONCOLOGY | Age: 36
End: 2021-09-20

## 2021-09-20 NOTE — TELEPHONE ENCOUNTER
Left message for patient informing her of prior auth approval for genetic testing (#[id number]) which is good through 12/31/21. Requested that she call me back to determine time for her to briefly come back to [facility] to have blood work drawn and sign consent.

## 2022-01-12 ENCOUNTER — TELEPHONE (OUTPATIENT)
Dept: ONCOLOGY | Age: 37
End: 2022-01-12

## 2022-01-12 NOTE — TELEPHONE ENCOUNTER
Phone message received stating that patient would now like to proceed with genetic testing. Left message for patient explaining that prior auth had  on 21. I will contact her when the new PA has been approved so that we can set up a time for blood work. Encouraged her to call me back in the meantime if additional questions.

## 2022-01-18 NOTE — TELEPHONE ENCOUNTER
Left message for May Tigre notifying her that extension was approved for the genetic testing prior Stoney RUELAS # [id number]) good through 4/12/22. Encouraged her to call me back to set up time for blood work.

## 2022-02-18 ENCOUNTER — NURSE ONLY (OUTPATIENT)
Dept: ONCOLOGY | Age: 37
End: 2022-02-18

## 2022-02-18 ENCOUNTER — HOSPITAL ENCOUNTER (OUTPATIENT)
Age: 37
Discharge: HOME | End: 2022-02-18
Payer: COMMERCIAL

## 2022-02-18 DIAGNOSIS — Z13.29 SCREENING FOR THYROID DISORDER: ICD-10-CM

## 2022-02-18 DIAGNOSIS — Z13.220 LIPID SCREENING: ICD-10-CM

## 2022-02-18 DIAGNOSIS — Z13.1 DIABETES MELLITUS SCREENING: ICD-10-CM

## 2022-02-18 LAB
GLUCOSE FASTING: 105 MG/DL (ref 70–99)
TSH SERPL DL<=0.05 MIU/L-ACNC: 1.68 MIU/L (ref 0.3–5)

## 2022-02-18 PROCEDURE — 80061 LIPID PANEL: CPT

## 2022-02-18 PROCEDURE — 84443 ASSAY THYROID STIM HORMONE: CPT

## 2022-02-18 PROCEDURE — 82947 ASSAY GLUCOSE BLOOD QUANT: CPT

## 2022-02-18 PROCEDURE — 36415 COLL VENOUS BLD VENIPUNCTURE: CPT

## 2022-02-19 LAB
CHOLESTEROL, FASTING: 197 MG/DL
CHOLESTEROL/HDL RATIO: 2.7
HDLC SERPL-MCNC: 72 MG/DL
LDL CHOLESTEROL: 109 MG/DL (ref 0–130)
TRIGLYCERIDE, FASTING: 78 MG/DL
VLDLC SERPL CALC-MCNC: NORMAL MG/DL (ref 1–30)

## 2022-02-21 NOTE — PROGRESS NOTES
Blood drawn for genetic testing by [facility] lab, packaged and sent via [employer] to [facility]. Will call patient with results when available.

## 2022-03-08 ENCOUNTER — TELEPHONE (OUTPATIENT)
Dept: ONCOLOGY | Age: 37
End: 2022-03-08

## 2022-03-08 NOTE — TELEPHONE ENCOUNTER
Left message for Mali Posadas notifying her that her genetic test results are available. Requested that she return my phone call at her earliest convenience to review results.

## 2022-03-10 PROBLEM — Z80.3 FAMILY HISTORY OF BREAST CANCER: Status: ACTIVE | Noted: 2022-03-10

## 2022-03-10 NOTE — TELEPHONE ENCOUNTER
3 [facility]   Hereditary Cancer Risk Assessment     Name: Cathy Dias  YOB: 1985  Date of Results Disclosure: 3/8/22     HISTORY   Ms. Marlen Jaquez was seen for genetic counseling at the request of Yara Weiner DO due to her family history of cancer. At that time, Ms. Marlen Jaquez chose to pursue genetic testing via the CancerNext Expanded + RNA gene panel. These results were discussed with Ms. Marlen Jaquez via telephone. A summary of Ms. Tello's results and recommendations are below. RESULTS  [facility] CancerNext-Expanded Panel + RNAinsight: NEGATIVE - NO CLINICALLY SIGNIFICANT MUTATIONS DETECTED   This panel included the analysis of 77 genes associated with hereditary cancer including: AIP, ALK, APC, JODY, BAP1, BARD1, BLM, BMPR1A, BRCA1, BRCA2, BRIP1, CDC73, CDH1, CDK4, CDKN1B, CDKN2A, CHEK2, CTNNA1, DICER1, EGFR, EGLN1, EPCAM, FANCC, FH, FLCN, GALNT12, GREM1, HOXB13, KIF1B, KIT1, LZTR1, MAX, MEN1, MET, MITF, MLH1, MSH2, MSH3, MSH6, MUTYH, NBN, NF1, NF2, NTHL1, PALB2, PDGFRA, PHOX2B, PMS2, POLD1, POLE, POT1, JOMDL4C, PTCH1, PTEN, RAD51C, RAD51D, RB1, RECQL, RET, SDHA, SDHAF2, SDHB, SDHC, ,SDHD, SMAD4, SMARCA4, SMARCB1, SMARCE1, STK11, SUFU, FFKA823, TP53, TSC1, TSC2, VHL, and XRCC2. In addition, no clinically relevant aberrant RNA transcripts were detected in select genes. Please refer to genetic test report for technical details. We discussed that Ms. Tello's negative test result greatly reduces the likelihood that she carries a hereditary gene mutation. However, it is possible that her family history of cancer is due to a hereditary mutation which she did not inherit. It is also possible that her family history of cancer may be due to a gene for which testing was not performed or which has yet to be discovered. RECOMMENDATIONS  1) While Ms. Marlen Jaquez does not carry a known hereditary gene mutation, her risk for breast cancer may still be elevated due to her remaining family history of breast cancer. Based on Ms. Tello's personal risk factors and family history of breast cancer, her estimated lifetime risk for breast cancer is 32% according to the [employer] risk model. The [employer] (NCCN) recommends that women with a lifetime risk of breast cancer 20% or higher consider the following screening and risk reducing options:     NCCN Recommendation Age to Begin Frequency    Breast awareness - Women should be familiar with their breasts and promptly report changes to their healthcare provider. Periodic, consistent breast self-examination (BSE) may be beneficial  Individualized  N/A    Clinical Breast Examination  Individualized Every 6-12 months   Breast MRI with contrast  29years old  Annual   Mammogram (consider tomosynthesis)  30years old  Annual    Consider risk reducing agents (i.e. Tamoxifen)  Individualized  N/A    *age to begin screening is based on the onset of breast cancer in Ms. Tello's family    2) Ms. Crystal Garcia should continue general population cancer screening guidelines as directed by her physicians. RECOMMENDATIONS FOR FAMILY MEMBERS   1) Genetic testing is not recommended for Ms. Tello's children based on her negative test results. However, this recommendation does not take into consideration any family history of cancer in their paternal family. 2) It is possible that the cancers in Ms. Tello's family are due to a hereditary gene mutation that she did not inherit. Therefore, her relatives (particularly those with a current or previous cancer diagnosis) may consider genetic counseling and testing to clarify this possibility. Relatives may contact the [facility] at [phone] or locate a genetic counselor at [email or web address].     3) We encourage Ms. Tello's relatives to discuss their family history of cancer with their physicians to determine the most appropriate cancer screening recommendations. Ms. Christiano Bustamante female relatives may benefit from a formal breast cancer risk assessment by the [employer] or Lolis Benigno risk models to determine if additional breast cancer screening is warranted. SUMMARY & PLAN   1) Ms. Christiano Bustamante genetic test results are negative meaning there were no clinically significant mutations detected in the 77 genes analyzed. 2) Ms. Christiano Bustamante lifetime risk for breast cancer is 32% according to the Lolis Benigno risk model. She may consider the NCCN guidelines outlined above for breast cancer surveillance. This includes annual breast MRI screening staggered 6 months apart from annual mammograms at her current age (8 years younger than her mother's diagnosis of breast cancer). She is due for her annual mammogram and was encouraged to follow up with her referring provider for breast MRI screening. She has the option to review recommendations with a medical oncologist as well. 3) There are no other changes in medical management for Ms. Clarence Peck based on her negative genetic test results. 4) We encourage Ms. Clarence Peck to contact us every 1-2 years to determine if there are any new genetic testing or research options available. 5) We encourage Ms. Clarence Peck to contact us with updates to her personal and/or familys cancer history as this information may alter our assessment and/or recommendations. The [address] [place] [address] National Program would be glad to offer our assistance should you have any questions or concerns about this information. Please feel free to contact us at [phone]. Sebastian Vega, Guevara Subhash [address], [facility]   Licensed Genetic Counselor         CC:  Ms. Gely Aponte,

## 2022-08-22 ENCOUNTER — HOSPITAL ENCOUNTER (OUTPATIENT)
Dept: MRI IMAGING | Age: 37
Discharge: HOME | End: 2022-08-24
Payer: COMMERCIAL

## 2022-08-22 DIAGNOSIS — S83.411A SPRAIN OF MEDIAL COLLATERAL LIGAMENT OF RIGHT KNEE, INITIAL ENCOUNTER: ICD-10-CM

## 2022-08-22 DIAGNOSIS — S83.241A ACUTE MEDIAL MENISCUS TEAR OF RIGHT KNEE, INITIAL ENCOUNTER: ICD-10-CM

## 2022-08-22 PROCEDURE — 73721 MRI JNT OF LWR EXTRE W/O DYE: CPT

## 2023-07-25 ENCOUNTER — HOSPITAL ENCOUNTER (OUTPATIENT)
Dept: WOMENS IMAGING | Age: 38
Discharge: HOME | End: 2023-07-27
Payer: COMMERCIAL

## 2023-07-25 DIAGNOSIS — Z12.31 ENCOUNTER FOR SCREENING MAMMOGRAM FOR HIGH-RISK PATIENT: ICD-10-CM

## 2023-07-25 PROCEDURE — 77063 BREAST TOMOSYNTHESIS BI: CPT

## 2023-08-03 ENCOUNTER — HOSPITAL ENCOUNTER (EMERGENCY)
Age: 38
Discharge: HOME | End: 2023-08-03
Attending: EMERGENCY MEDICINE
Payer: COMMERCIAL

## 2023-08-03 VITALS
TEMPERATURE: 98 F | SYSTOLIC BLOOD PRESSURE: 123 MMHG | DIASTOLIC BLOOD PRESSURE: 77 MMHG | BODY MASS INDEX: 28.63 KG/M2 | HEART RATE: 79 BPM | RESPIRATION RATE: 18 BRPM | WEIGHT: 200 LBS | HEIGHT: 70 IN | OXYGEN SATURATION: 100 %

## 2023-08-03 DIAGNOSIS — R31.9 URINARY TRACT INFECTION WITH HEMATURIA, SITE UNSPECIFIED: Primary | ICD-10-CM

## 2023-08-03 DIAGNOSIS — N39.0 URINARY TRACT INFECTION WITH HEMATURIA, SITE UNSPECIFIED: Primary | ICD-10-CM

## 2023-08-03 LAB
BACTERIA URNS QL MICRO: ABNORMAL
BILIRUB UR QL STRIP: NEGATIVE
CHARACTER UR: ABNORMAL
CLARITY UR: ABNORMAL
COLOR UR: YELLOW
EPI CELLS #/AREA URNS HPF: ABNORMAL /HPF (ref 0–5)
GLUCOSE UR STRIP-MCNC: NEGATIVE MG/DL
HGB UR QL STRIP.AUTO: ABNORMAL
KETONES UR STRIP-MCNC: NEGATIVE MG/DL
LEUKOCYTE ESTERASE UR QL STRIP: ABNORMAL
NITRITE UR QL STRIP: POSITIVE
PH UR STRIP: 6 [PH] (ref 5–8)
PROT UR STRIP-MCNC: ABNORMAL MG/DL
RBC #/AREA URNS HPF: ABNORMAL /HPF (ref 0–2)
SP GR UR STRIP: 1.02 (ref 1–1.03)
UROBILINOGEN UR STRIP-ACNC: NORMAL EU/DL (ref 0–1)
WBC #/AREA URNS HPF: ABNORMAL /HPF (ref 0–5)

## 2023-08-03 PROCEDURE — 99283 EMERGENCY DEPT VISIT LOW MDM: CPT | Performed by: EMERGENCY MEDICINE

## 2023-08-03 PROCEDURE — 6370000000 HC RX 637 (ALT 250 FOR IP): Performed by: EMERGENCY MEDICINE

## 2023-08-03 PROCEDURE — 87086 URINE CULTURE/COLONY COUNT: CPT

## 2023-08-03 PROCEDURE — 87186 SC STD MICRODIL/AGAR DIL: CPT

## 2023-08-03 PROCEDURE — 81001 URINALYSIS AUTO W/SCOPE: CPT

## 2023-08-03 PROCEDURE — 87088 URINE BACTERIA CULTURE: CPT

## 2023-08-03 RX ORDER — PHENAZOPYRIDINE HYDROCHLORIDE 200 MG/1
200 TABLET, FILM COATED ORAL 3 TIMES DAILY PRN
Qty: 6 TABLET | Refills: 0 | Status: SHIPPED | OUTPATIENT
Start: 2023-08-03 | End: 2023-08-06

## 2023-08-03 RX ORDER — PHENAZOPYRIDINE HYDROCHLORIDE 100 MG/1
200 TABLET, FILM COATED ORAL ONCE
Status: COMPLETED | OUTPATIENT
Start: 2023-08-03 | End: 2023-08-03

## 2023-08-03 RX ORDER — CEPHALEXIN 500 MG/1
500 CAPSULE ORAL 2 TIMES DAILY
Qty: 14 CAPSULE | Refills: 0 | Status: SHIPPED | OUTPATIENT
Start: 2023-08-03 | End: 2023-08-10

## 2023-08-03 RX ORDER — CEPHALEXIN 250 MG/1
500 CAPSULE ORAL ONCE
Status: COMPLETED | OUTPATIENT
Start: 2023-08-03 | End: 2023-08-03

## 2023-08-03 RX ADMIN — PHENAZOPYRIDINE HYDROCHLORIDE 200 MG: 100 TABLET ORAL at 04:27

## 2023-08-03 RX ADMIN — CEPHALEXIN 500 MG: 250 CAPSULE ORAL at 04:27

## 2023-08-03 ASSESSMENT — PAIN DESCRIPTION - DESCRIPTORS: DESCRIPTORS: SPASM

## 2023-08-03 ASSESSMENT — PAIN DESCRIPTION - PAIN TYPE: TYPE: ACUTE PAIN

## 2023-08-03 ASSESSMENT — PAIN DESCRIPTION - LOCATION: LOCATION: ABDOMEN

## 2023-08-03 ASSESSMENT — PAIN SCALES - GENERAL: PAINLEVEL_OUTOF10: 5

## 2023-08-03 ASSESSMENT — PAIN - FUNCTIONAL ASSESSMENT: PAIN_FUNCTIONAL_ASSESSMENT: 0-10

## 2023-08-03 ASSESSMENT — PAIN DESCRIPTION - ORIENTATION: ORIENTATION: INNER

## 2023-08-03 NOTE — ED PROVIDER NOTES
12 [facility] Emergency Department  [address] [id number] [facility] [facility] [address]  Phone: [phone]  Fax: [phone]      Pt Name: Satish Cole  [facility]:[id number]  [address] 1985  Date of evaluation: 8/3/2023      CHIEF COMPLAINT       Chief Complaint   Patient presents with    Urinary Tract Infection     Patient states feeling like she has a UTI. Patient states having bladder spasms        HISTORY OF PRESENT ILLNESS   Satish Cole is a 41 y.o. female who presents for evaluation of urinary symptoms. The patient reports that starting yesterday she developed gradual onset, constant, progressive, urinary frequency, urinary urgency, and bladder spasm. She denies any associated dysuria or hematuria. The patient was recently swimming in the 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 She gets approximately 3 UTIs per year with similar symptoms. The patient cannot sleep tonight secondary to her bladder spasm. She took ibuprofen without improvement. She has some associated nausea but denies any vomiting. The patient denies fever, chills, headache, vision changes, neck pain, back pain, chest pain, shortness of breath, bowel symptoms, vaginal bleeding, vaginal discharge, recent injury or illness. REVIEW OF SYSTEMS     Positive: Bladder spasm, urinary frequency, urinary urgency  Ten point review of systems was reviewed and is negative unless otherwise noted in the HPI    [address] [facility]    has a past medical history of Abdominal pain, Abdominal pain, Anemia, Caffeine use, Cervical laceration, Constipation, Endometriosis, Heart murmur, Hemorrhoid, Hypoglycemia, Psoriasis, Rh negative state in antepartum period, and Ureter injury. SURGICAL HISTORY      has a past surgical history that includes Appendectomy (2010); Vulva surgery (2008); Knee arthroscopy; [place] tooth extraction (2012); laparoscopy (2009);

## 2023-08-04 LAB
MICROORGANISM SPEC CULT: ABNORMAL
SPECIMEN DESCRIPTION: ABNORMAL
